# Patient Record
Sex: MALE | Race: BLACK OR AFRICAN AMERICAN | NOT HISPANIC OR LATINO | Employment: UNEMPLOYED | URBAN - METROPOLITAN AREA
[De-identification: names, ages, dates, MRNs, and addresses within clinical notes are randomized per-mention and may not be internally consistent; named-entity substitution may affect disease eponyms.]

---

## 2018-09-02 RX ORDER — SPIRONOLACTONE 50 MG/1
TABLET, FILM COATED ORAL
Qty: 20 TABLET | Refills: 10 | Status: SHIPPED | OUTPATIENT
Start: 2018-09-02

## 2020-01-01 ENCOUNTER — HOSPITAL ENCOUNTER (EMERGENCY)
Facility: HOSPITAL | Age: 40
End: 2020-06-22
Attending: EMERGENCY MEDICINE
Payer: MEDICAID

## 2020-01-01 VITALS — WEIGHT: 255.19 LBS

## 2020-01-01 DIAGNOSIS — Z84.1 HISTORY OF CHRONIC RENAL FAILURE: ICD-10-CM

## 2020-01-01 DIAGNOSIS — I46.9 CARDIAC ARREST: Primary | ICD-10-CM

## 2020-01-01 DIAGNOSIS — Z99.2 DEPENDENCE ON PERITONEAL DIALYSIS: ICD-10-CM

## 2020-01-01 PROCEDURE — 99291 CRITICAL CARE FIRST HOUR: CPT | Mod: 25

## 2020-01-01 PROCEDURE — 63600175 PHARM REV CODE 636 W HCPCS

## 2020-01-01 PROCEDURE — 92950 HEART/LUNG RESUSCITATION CPR: CPT

## 2020-01-01 PROCEDURE — 63600175 PHARM REV CODE 636 W HCPCS: Performed by: EMERGENCY MEDICINE

## 2020-01-01 PROCEDURE — 31500 INSERT EMERGENCY AIRWAY: CPT

## 2020-01-01 PROCEDURE — 25000003 PHARM REV CODE 250

## 2020-01-01 PROCEDURE — 25000003 PHARM REV CODE 250: Performed by: EMERGENCY MEDICINE

## 2020-01-01 RX ORDER — EPINEPHRINE 0.1 MG/ML
INJECTION INTRAVENOUS CODE/TRAUMA/SEDATION MEDICATION
Status: COMPLETED | OUTPATIENT
Start: 2020-01-01 | End: 2020-01-01

## 2020-01-01 RX ORDER — SODIUM BICARBONATE 1 MEQ/ML
SYRINGE (ML) INTRAVENOUS CODE/TRAUMA/SEDATION MEDICATION
Status: COMPLETED | OUTPATIENT
Start: 2020-01-01 | End: 2020-01-01

## 2020-01-01 RX ORDER — CALCIUM CHLORIDE INJECTION 100 MG/ML
INJECTION, SOLUTION INTRAVENOUS CODE/TRAUMA/SEDATION MEDICATION
Status: COMPLETED | OUTPATIENT
Start: 2020-01-01 | End: 2020-01-01

## 2020-01-01 RX ADMIN — EPINEPHRINE 1 MG: 0.1 INJECTION, SOLUTION ENDOTRACHEAL; INTRACARDIAC; INTRAVENOUS at 05:06

## 2020-01-01 RX ADMIN — SODIUM BICARBONATE 100 MEQ: 84 INJECTION, SOLUTION INTRAVENOUS at 05:06

## 2020-01-01 RX ADMIN — CALCIUM CHLORIDE 1 G: 100 INJECTION INTRAVENOUS; INTRAVENTRICULAR at 05:06

## 2020-06-22 NOTE — ED NOTES
[place] 's office giving OK for family to view body but will not be officially released until a  home is selected.

## 2020-06-22 NOTE — ED NOTES
Family found patient face down and started CPR with neighbor prior to [facility] arrival. [facility] got ROSC for a short period before losing pulse again and regaining ROSC. Patient received 9 mg epi and was shocked once by [facility].

## 2020-06-22 NOTE — ED PROVIDER NOTES
SCRIBE #1 NOTE: I, Sonia Velazco, am scribing for, and in the presence of, Zoraida Lemus DO. I have scribed the entire note.       History     Chief Complaint   Patient presents with    Cardiac Arrest     Review of patient's allergies indicates:  No Known Allergies      History of Present Illness     HPI    6/22/2020, 5:05  PM  History obtained from the patient      History of Present Illness: Sharon Oneill is a 40 y.o. male patient with a PMHx of DM and HTN who presents to the Emergency Department for evaluation of cardiac arrest which onset suddenly just PTA. Pt was found unresponsive face down on the floor. Pt's family reports that prior to his collapse pt was c/o SOB and abd pain. Pt is a peritoneal dialysis pt and he last had dialysis yesterday. Pt's neighbor began CPR after finding pt down. Pt was in asystole. En route pt was given 7 of epi with return of circulation. Pt went down again en route and was given 5 rounds of epi with a angelique tube placed with return of circulation. When pt was transferred into ED bed he had no pulse and the angelique tube was in place. CPR was started. ROS limited due to pt being unresponsive       Arrival mode: [facility]    PCP: Eric Harkins MD        Past Medical History:  Past Medical History:   Diagnosis Date    Diabetes mellitus     Dialysis patient     Hypertension        Past Surgical History:  Peritoneal dialysis catheter      Family History:  Unable to assess as patient is unresponsive      Social History:  Unable to assess as patient is unresponsive   Review of Systems     Review of Systems   Unable to perform ROS: Patient unresponsive        Physical Exam     Initial Vitals   BP Pulse Resp Temp SpO2   -- -- -- -- --      MAP       --          Physical Exam  Nursing Notes and Vital Signs Reviewed.  Constitutional: Patient is in severe distress.   Head: Atraumatic. Normocephalic.  Eyes: Conjunctivae are not pale. No scleral icterus.  ENT: Mucous membranes are moist.  Oropharynx is clear and symmetric.  Addi tube is placed.  Breath sounds are coarse with Addi tube.  Neck: Supple. No lymphadenopathy.  Cardiovascular:  Note O2 heart beat auscultated   Pulmonary/Chest: Coarse breath sounds bilaterally with bagging..  Abdominal: Soft and non-distended.  There is no tenderness.  No rebound, guarding, or rigidity. Peritoneal catheter in place.  Genitourinary: No CVA tenderness  Musculoskeletal:  No movement to painful stimulus.  Skin: Warm and dry.  Neurological:  Pt unresponsive.  GCS 3.  Psychiatric: Pt unresponsive - unable to assess.     ED Course   Intubation    Date/Time: 6/22/2020 5:05 PM  Performed by: Zoraida Lemus DO  Authorized by: Zoraida Lemus DO   Consent Done: Emergent Situation  Indications: respiratory distress  Intubation method: direct  Patient status: unconscious  Laryngoscope size: Mac 4  Tube size: 7.5 mm  Number of attempts: 1  Post-procedure assessment: CO2 detector  ETT to teeth: 23 cm    Critical Care    Date/Time: 6/23/2020 5:05 PM  Performed by: Zoraida Lemus DO  Authorized by: Zoraida Lemus DO   Direct patient critical care time: 10 minutes  Additional history critical care time: 12 minutes  Ordering / reviewing critical care time: 0 minutes  Documentation critical care time: 10 minutes  Total critical care time (exclusive of procedural time) : 32 minutes  Critical care time was exclusive of separately billable procedures and treating other patients.  Critical care was necessary to treat or prevent imminent or life-threatening deterioration of the following conditions: circulatory failure.  Critical care was time spent personally by me on the following activities: pulse oximetry, re-evaluation of patient's condition, obtaining history from patient or surrogate, examination of patient, review of old charts, ordering and performing treatments and interventions and evaluation of patient's response to treatment.        ED Vital  Signs:  Vitals:    20 1746   Weight: 115.8 kg (255 lb 3.2 oz)       Abnormal Lab Results:  Labs Reviewed - No data to display     All Lab Results:  None.    Imaging Results:  Imaging Results    None                     The Emergency Provider reviewed the vital signs and test results, which are outlined above.     ED Discussion       17:05: No pulse detected. CPR started at this time.    17:07: Pt given: 1 mg epi, 1 bicarbonate and 1 calcium chloride.    17:09: Pt given 1 bicarbonate. No pulse detected.    17:10: Pt given 1 of epi.    17:11: No pulse detected. PEA. Intubation started.    17:14: Pt given 1 of epi.    17:15: No pulse detected. PEA.    17:15: Time of death. Bedside US showed no cardiac activity                       ED Medication(s):  Medications   EPINEPHrine 0.1 mg/mL injection (1 mg Intravenous Given 20 1710)   sodium bicarbonate 8.4 % (1 mEq/mL) injection (100 mEq Intravenous Given 20 1708)   calcium chloride 100 mg/mL (10 %) injection (1 g Intravenous Given 20 1707)   EPINEPHrine 0.1 mg/mL injection (1 mg Intravenous Given 20 1714)       Discharge Medication List as of 2020  6:58 PM                  Scribe Attestation:   Scribe #1: I performed the above scribed service and the documentation accurately describes the services I performed. I attest to the accuracy of the note.     Attending:   Physician Attestation Statement for Scribe #1: I, Zoraida Lemus DO, personally performed the services described in this documentation, as scribed by Sonia Velazco, in my presence, and it is both accurate and complete.           Clinical Impression       ICD-10-CM ICD-9-CM   1. Cardiac arrest  I46.9 427.5   2. History of chronic renal failure  Z84.1 V13.09   3. Dependence on peritoneal dialysis  Z99.2 V45.11       Disposition:   Disposition:          Zoraida Lemus DO  [id number]

## 2020-06-22 NOTE — PROGRESS NOTES
Patient arrived to ED unconcious with [facility] ET tube in place; Patient being manually ventilated per EMS. RTs at bs, setting up for intubation and RT took over manually ventilation with ambu.

## 2020-06-22 NOTE — ED NOTES
Family viewing patient. Patient will go to [facility] and call with  home decision.